# Patient Record
Sex: FEMALE | Race: BLACK OR AFRICAN AMERICAN | NOT HISPANIC OR LATINO
[De-identification: names, ages, dates, MRNs, and addresses within clinical notes are randomized per-mention and may not be internally consistent; named-entity substitution may affect disease eponyms.]

---

## 2017-01-05 PROBLEM — Z00.00 ENCOUNTER FOR PREVENTIVE HEALTH EXAMINATION: Noted: 2017-01-05

## 2017-01-30 ENCOUNTER — APPOINTMENT (OUTPATIENT)
Dept: ENDOCRINOLOGY | Facility: CLINIC | Age: 57
End: 2017-01-30

## 2017-01-30 ENCOUNTER — OTHER (OUTPATIENT)
Age: 57
End: 2017-01-30

## 2017-01-30 VITALS — WEIGHT: 231 LBS | DIASTOLIC BLOOD PRESSURE: 81 MMHG | SYSTOLIC BLOOD PRESSURE: 145 MMHG | HEART RATE: 102 BPM

## 2017-01-30 DIAGNOSIS — F17.200 NICOTINE DEPENDENCE, UNSPECIFIED, UNCOMPLICATED: ICD-10-CM

## 2017-01-30 RX ORDER — ERGOCALCIFEROL 1.25 MG/1
1.25 MG CAPSULE, LIQUID FILLED ORAL
Qty: 12 | Refills: 0 | Status: ACTIVE | COMMUNITY
Start: 2016-08-16

## 2017-01-30 RX ORDER — RABEPRAZOLE SODIUM 20 MG/1
20 TABLET, DELAYED RELEASE ORAL
Qty: 30 | Refills: 0 | Status: ACTIVE | COMMUNITY
Start: 2016-06-16

## 2017-01-30 RX ORDER — CYCLOBENZAPRINE HYDROCHLORIDE 10 MG/1
10 TABLET, FILM COATED ORAL
Qty: 15 | Refills: 0 | Status: ACTIVE | COMMUNITY
Start: 2016-12-01

## 2017-01-30 RX ORDER — ALCOHOL ANTISEPTIC PADS
70 PADS, MEDICATED (EA) TOPICAL
Qty: 100 | Refills: 0 | Status: ACTIVE | COMMUNITY
Start: 2016-11-22

## 2017-01-30 RX ORDER — SITAGLIPTIN 100 MG/1
100 TABLET, FILM COATED ORAL
Qty: 30 | Refills: 0 | Status: DISCONTINUED | COMMUNITY
Start: 2016-11-21 | End: 2017-01-30

## 2017-06-29 ENCOUNTER — APPOINTMENT (OUTPATIENT)
Dept: INTERNAL MEDICINE | Facility: CLINIC | Age: 57
End: 2017-06-29

## 2017-06-29 PROBLEM — Z00.00 ENCOUNTER FOR PREVENTIVE HEALTH EXAMINATION: Noted: 2017-06-29

## 2017-08-10 ENCOUNTER — APPOINTMENT (OUTPATIENT)
Dept: ENDOCRINOLOGY | Facility: CLINIC | Age: 57
End: 2017-08-10
Payer: COMMERCIAL

## 2017-08-10 VITALS — SYSTOLIC BLOOD PRESSURE: 129 MMHG | WEIGHT: 220 LBS | DIASTOLIC BLOOD PRESSURE: 83 MMHG | HEART RATE: 82 BPM

## 2017-08-10 PROCEDURE — 99214 OFFICE O/P EST MOD 30 MIN: CPT

## 2017-08-10 RX ORDER — FLUTICASONE PROPIONATE 50 UG/1
50 SPRAY, METERED NASAL
Qty: 48 | Refills: 0 | Status: DISCONTINUED | COMMUNITY
Start: 2016-08-16 | End: 2017-08-10

## 2017-08-11 LAB
ALBUMIN SERPL ELPH-MCNC: 4.4 G/DL
ALP BLD-CCNC: 68 U/L
ALT SERPL-CCNC: 14 U/L
ANION GAP SERPL CALC-SCNC: 16 MMOL/L
AST SERPL-CCNC: 15 U/L
BILIRUB SERPL-MCNC: 0.3 MG/DL
BUN SERPL-MCNC: 13 MG/DL
CALCIUM SERPL-MCNC: 9.6 MG/DL
CHLORIDE SERPL-SCNC: 102 MMOL/L
CHOLEST SERPL-MCNC: 154 MG/DL
CHOLEST/HDLC SERPL: 4.1 RATIO
CO2 SERPL-SCNC: 24 MMOL/L
CREAT SERPL-MCNC: 1.26 MG/DL
CREAT SPEC-SCNC: 297 MG/DL
GLUCOSE SERPL-MCNC: 94 MG/DL
HBA1C MFR BLD HPLC: 6 %
HDLC SERPL-MCNC: 38 MG/DL
LDLC SERPL CALC-MCNC: 67 MG/DL
MICROALBUMIN 24H UR DL<=1MG/L-MCNC: 5.6 MG/DL
MICROALBUMIN/CREAT 24H UR-RTO: 19 MG/G
POTASSIUM SERPL-SCNC: 3.5 MMOL/L
PROT SERPL-MCNC: 7.2 G/DL
SODIUM SERPL-SCNC: 142 MMOL/L
TRIGL SERPL-MCNC: 243 MG/DL
TSH SERPL-ACNC: 0.9 UIU/ML

## 2017-08-18 ENCOUNTER — RX RENEWAL (OUTPATIENT)
Age: 57
End: 2017-08-18

## 2017-11-07 ENCOUNTER — MEDICATION RENEWAL (OUTPATIENT)
Age: 57
End: 2017-11-07

## 2017-11-07 RX ORDER — BLOOD-GLUCOSE METER
KIT MISCELLANEOUS
Qty: 1 | Refills: 0 | Status: ACTIVE | COMMUNITY
Start: 2016-11-22 | End: 1900-01-01

## 2017-11-07 RX ORDER — LANCETS 28 GAUGE
EACH MISCELLANEOUS
Qty: 100 | Refills: 3 | Status: ACTIVE | COMMUNITY
Start: 2016-11-22 | End: 1900-01-01

## 2018-02-12 ENCOUNTER — RX RENEWAL (OUTPATIENT)
Age: 58
End: 2018-02-12

## 2018-02-12 ENCOUNTER — APPOINTMENT (OUTPATIENT)
Dept: ENDOCRINOLOGY | Facility: CLINIC | Age: 58
End: 2018-02-12
Payer: COMMERCIAL

## 2018-02-12 VITALS
DIASTOLIC BLOOD PRESSURE: 85 MMHG | HEART RATE: 77 BPM | WEIGHT: 225 LBS | HEIGHT: 66.5 IN | SYSTOLIC BLOOD PRESSURE: 129 MMHG | BODY MASS INDEX: 35.73 KG/M2

## 2018-02-12 PROCEDURE — 36415 COLL VENOUS BLD VENIPUNCTURE: CPT

## 2018-02-12 PROCEDURE — 99214 OFFICE O/P EST MOD 30 MIN: CPT | Mod: 25

## 2018-02-12 RX ORDER — METFORMIN HYDROCHLORIDE 1000 MG/1
1000 TABLET, COATED ORAL
Qty: 180 | Refills: 3 | Status: DISCONTINUED | COMMUNITY
Start: 2017-08-18 | End: 2018-02-12

## 2018-02-13 LAB
ANION GAP SERPL CALC-SCNC: 19 MMOL/L
BUN SERPL-MCNC: 9 MG/DL
CALCIUM SERPL-MCNC: 9.7 MG/DL
CHLORIDE SERPL-SCNC: 104 MMOL/L
CO2 SERPL-SCNC: 22 MMOL/L
CREAT SERPL-MCNC: 1.24 MG/DL
GLUCOSE SERPL-MCNC: 102 MG/DL
HBA1C MFR BLD HPLC: 6.2 %
POTASSIUM SERPL-SCNC: 4.2 MMOL/L
SODIUM SERPL-SCNC: 145 MMOL/L

## 2018-07-18 ENCOUNTER — RX RENEWAL (OUTPATIENT)
Age: 58
End: 2018-07-18

## 2018-11-27 ENCOUNTER — APPOINTMENT (OUTPATIENT)
Dept: NEUROLOGY | Facility: CLINIC | Age: 58
End: 2018-11-27
Payer: COMMERCIAL

## 2018-11-27 VITALS
HEIGHT: 66 IN | BODY MASS INDEX: 36.16 KG/M2 | OXYGEN SATURATION: 95 % | WEIGHT: 225 LBS | DIASTOLIC BLOOD PRESSURE: 98 MMHG | HEART RATE: 79 BPM | SYSTOLIC BLOOD PRESSURE: 146 MMHG

## 2018-11-27 PROCEDURE — 99204 OFFICE O/P NEW MOD 45 MIN: CPT

## 2019-02-11 ENCOUNTER — MEDICATION RENEWAL (OUTPATIENT)
Age: 59
End: 2019-02-11

## 2019-02-12 ENCOUNTER — MEDICATION RENEWAL (OUTPATIENT)
Age: 59
End: 2019-02-12

## 2019-03-08 ENCOUNTER — RX RENEWAL (OUTPATIENT)
Age: 59
End: 2019-03-08

## 2019-04-01 ENCOUNTER — FORM ENCOUNTER (OUTPATIENT)
Age: 59
End: 2019-04-01

## 2019-04-02 ENCOUNTER — OUTPATIENT (OUTPATIENT)
Dept: OUTPATIENT SERVICES | Facility: HOSPITAL | Age: 59
LOS: 1 days | End: 2019-04-02
Payer: COMMERCIAL

## 2019-04-02 ENCOUNTER — APPOINTMENT (OUTPATIENT)
Dept: CT IMAGING | Facility: HOSPITAL | Age: 59
End: 2019-04-02

## 2019-04-02 ENCOUNTER — APPOINTMENT (OUTPATIENT)
Dept: PULMONOLOGY | Facility: CLINIC | Age: 59
End: 2019-04-02
Payer: COMMERCIAL

## 2019-04-02 VITALS
HEIGHT: 66 IN | HEART RATE: 83 BPM | BODY MASS INDEX: 39.21 KG/M2 | TEMPERATURE: 98 F | DIASTOLIC BLOOD PRESSURE: 80 MMHG | WEIGHT: 244 LBS | OXYGEN SATURATION: 96 % | SYSTOLIC BLOOD PRESSURE: 140 MMHG

## 2019-04-02 DIAGNOSIS — Z80.9 FAMILY HISTORY OF MALIGNANT NEOPLASM, UNSPECIFIED: ICD-10-CM

## 2019-04-02 DIAGNOSIS — Z90.49 ACQUIRED ABSENCE OF OTHER SPECIFIED PARTS OF DIGESTIVE TRACT: Chronic | ICD-10-CM

## 2019-04-02 DIAGNOSIS — Z98.890 OTHER SPECIFIED POSTPROCEDURAL STATES: Chronic | ICD-10-CM

## 2019-04-02 DIAGNOSIS — Z96.649 PRESENCE OF UNSPECIFIED ARTIFICIAL HIP JOINT: Chronic | ICD-10-CM

## 2019-04-02 PROCEDURE — G0297: CPT | Mod: 26

## 2019-04-02 PROCEDURE — G0296 VISIT TO DETERM LDCT ELIG: CPT

## 2019-04-02 PROCEDURE — G0297: CPT

## 2019-04-08 ENCOUNTER — RESULT REVIEW (OUTPATIENT)
Age: 59
End: 2019-04-08

## 2019-04-22 ENCOUNTER — RX RENEWAL (OUTPATIENT)
Age: 59
End: 2019-04-22

## 2019-04-22 ENCOUNTER — APPOINTMENT (OUTPATIENT)
Dept: ENDOCRINOLOGY | Facility: CLINIC | Age: 59
End: 2019-04-22
Payer: COMMERCIAL

## 2019-04-22 VITALS
DIASTOLIC BLOOD PRESSURE: 89 MMHG | SYSTOLIC BLOOD PRESSURE: 141 MMHG | HEART RATE: 82 BPM | WEIGHT: 247 LBS | HEIGHT: 66 IN | BODY MASS INDEX: 39.7 KG/M2

## 2019-04-22 PROCEDURE — 99214 OFFICE O/P EST MOD 30 MIN: CPT

## 2019-04-22 RX ORDER — LIRAGLUTIDE 6 MG/ML
18 INJECTION SUBCUTANEOUS
Qty: 1 | Refills: 2 | Status: DISCONTINUED | COMMUNITY
Start: 2017-01-30 | End: 2019-04-22

## 2019-04-22 RX ORDER — ALLOPURINOL 100 MG/1
100 TABLET ORAL
Qty: 90 | Refills: 0 | Status: DISCONTINUED | COMMUNITY
Start: 2016-08-16 | End: 2019-04-22

## 2019-04-22 NOTE — ASSESSMENT
[FreeTextEntry1] : Diabetes, obesity. BMI 40.  goal A1c 7.0 or less.  A1c has been increasing.\par Change victoza to Ozempic for once/weekly dosing and possibly for more weight loss effect.  continue metformin but increase to BID dosing.\par will try adding bupropion for weight loss and smoking cessation.  OK to use nicotine patch with bupropion.  start with 1 tab hs and then increase to 2 tab daily.\par RTO 4 months

## 2019-04-22 NOTE — PHYSICAL EXAM
[Alert] : alert [Healthy Appearance] : healthy appearance [Normal Voice/Communication] : normal voice communication [No Lid Lag] : no lid lag [No Proptosis] : no proptosis [Thyroid Not Enlarged] : the thyroid was not enlarged [Normal Hearing] : hearing was normal [No Thyroid Nodules] : there were no palpable thyroid nodules [Clear to Auscultation] : lungs were clear to auscultation bilaterally [Normal S1, S2] : normal S1 and S2 [Regular Rhythm] : with a regular rhythm [Pedal Pulses Normal] : the pedal pulses are present [No Edema] : there was no peripheral edema [Acanthosis Nigricans] : acanthosis nigricans [Normal Sensation on Monofilament Testing] : normal sensation on monofilament testing of lower extremities [Normal Affect] : the affect was normal [Normal Mood] : the mood was normal [Foot Ulcers] : no foot ulcers [de-identified] : in wheelchair scooter

## 2019-04-22 NOTE — DATA REVIEWED
[FreeTextEntry1] : 9/18: A1c 7.0%, tot chol 162, trig 235, HDL 42, LDL 87, TSH 1.19, 25D 32\par 2/18: A1c 6.2%, Cr 1.24\par 8/17: A1c 6.0%, tot chol 154, trig 243, HDL 38, LDL 67, urine microalb/cr 19, TSH 0.90

## 2019-04-22 NOTE — HISTORY OF PRESENT ILLNESS
[FreeTextEntry1] : sugars have been higher the past 3 weeks.\par usually is around 140s in the mornings and now is ranging 170-200.\par no nocturia, polyuria\par not losing weight on Victoza.  taking metformin once/day (not BID)\par has eye and podiatry appts later this month (on 4/25)\par working on smoking cessation.  smoking under 1 ppd.  using nicotine patch on and off (? causing rash). Chantix not working.  tried Zyban in past  (?rash but may have been due to other meds that she was taking at the same time)\par \par PMH: hip replacement 2014, 2016\par gastric sleeve 2010\par FH: diabetes, sister\par Pt did not have gestational diabetes.\par \par Meds:\par Victoza 1.8mg/day\par metformin 1g bid--not taking regularly\par Celebrex 200mg bid, rabeprazole 20mg\par valsartan HCTZ\par allopurinol\par cyclobenzaprine prn\par vitamin D3\par

## 2019-04-24 ENCOUNTER — RX RENEWAL (OUTPATIENT)
Age: 59
End: 2019-04-24

## 2019-07-02 ENCOUNTER — APPOINTMENT (OUTPATIENT)
Dept: ENDOCRINOLOGY | Facility: CLINIC | Age: 59
End: 2019-07-02
Payer: COMMERCIAL

## 2019-07-02 VITALS
DIASTOLIC BLOOD PRESSURE: 67 MMHG | WEIGHT: 247 LBS | SYSTOLIC BLOOD PRESSURE: 126 MMHG | BODY MASS INDEX: 39.87 KG/M2 | HEART RATE: 87 BPM

## 2019-07-02 PROCEDURE — 99214 OFFICE O/P EST MOD 30 MIN: CPT

## 2019-07-02 RX ORDER — PEN NEEDLE, DIABETIC 29 G X1/2"
31G X 5 MM NEEDLE, DISPOSABLE MISCELLANEOUS
Qty: 100 | Refills: 0 | Status: DISCONTINUED | COMMUNITY
Start: 2016-11-22 | End: 2019-07-02

## 2019-07-03 ENCOUNTER — RX RENEWAL (OUTPATIENT)
Age: 59
End: 2019-07-03

## 2019-07-03 LAB
ANION GAP SERPL CALC-SCNC: 14 MMOL/L
BUN SERPL-MCNC: 11 MG/DL
CALCIUM SERPL-MCNC: 10.1 MG/DL
CHLORIDE SERPL-SCNC: 102 MMOL/L
CO2 SERPL-SCNC: 24 MMOL/L
CREAT SERPL-MCNC: 1.29 MG/DL
CREAT SPEC-SCNC: 255 MG/DL
ESTIMATED AVERAGE GLUCOSE: 177 MG/DL
GLUCOSE SERPL-MCNC: 123 MG/DL
HBA1C MFR BLD HPLC: 7.8 %
MICROALBUMIN 24H UR DL<=1MG/L-MCNC: 4.3 MG/DL
MICROALBUMIN/CREAT 24H UR-RTO: 17 MG/G
POTASSIUM SERPL-SCNC: 4 MMOL/L
SODIUM SERPL-SCNC: 140 MMOL/L

## 2019-07-03 NOTE — ASSESSMENT
[FreeTextEntry1] : Diabetes, obesity. BMI 39. A1c goal < 7%.  continue metformin and Ozempic. Advised to take metformin BID.\par ok to either reduce bupropion dose to once/day or take BID (instead of 2 tab at once), if either of these ways is more effective for her (to reduce smoking and food cravings)\par Neuropathy symptoms.  suggested trying alpha lipoic acid and capsaicin cream.  if symptoms persist, then will try prescription meds (gabapentin, duloxetine, etc).\par RTO 4 months\par

## 2019-07-03 NOTE — PHYSICAL EXAM
[Alert] : alert [Healthy Appearance] : healthy appearance [Normal Voice/Communication] : normal voice communication [No Proptosis] : no proptosis [Normal Hearing] : hearing was normal [Thyroid Not Enlarged] : the thyroid was not enlarged [No Lid Lag] : no lid lag [No Thyroid Nodules] : there were no palpable thyroid nodules [Clear to Auscultation] : lungs were clear to auscultation bilaterally [Normal S1, S2] : normal S1 and S2 [Regular Rhythm] : with a regular rhythm [Pedal Pulses Normal] : the pedal pulses are present [Normal Sensation on Monofilament Testing] : normal sensation on monofilament testing of lower extremities [Acanthosis Nigricans] : acanthosis nigricans [Normal Mood] : the mood was normal [Normal Affect] : the affect was normal [Foot Ulcers] : no foot ulcers [de-identified] : trace pedal edema [de-identified] : in wheelchair scooter

## 2019-07-03 NOTE — HISTORY OF PRESENT ILLNESS
[FreeTextEntry1] : Freestyle meter: 14d 152 (n14).  30d 159 (n31). testing in am.  135-160 range\par having pain in feet, shooting pains.  at night, but times vary. \par changed to Ozempic at end of May.\par when she was taking 1 tab of bupropion per day, she could feel less cravings to smoke but then when she increased to 2 tab/day, she didn't feel that reduction in urge.\par no chest pain or SOB.  had one day that she had polyuria, and then resolved\par saw ophtho in April, eyes ok.\par \par PMH: hip replacement 2014, 2016\par gastric sleeve 2010\par FH: diabetes, sister\par Pt did not have gestational diabetes.\par \par Meds:\par ozempic 1mg/week\par metformin 500mg/day\par Celebrex 200mg bid, rabeprazole 20mg\par valsartan HCTZ\par allopurinol\par cyclobenzaprine prn\par vitamin D3, flaxseed, vinegar pills\par

## 2020-01-20 ENCOUNTER — RX RENEWAL (OUTPATIENT)
Age: 60
End: 2020-01-20

## 2020-04-27 ENCOUNTER — RX RENEWAL (OUTPATIENT)
Age: 60
End: 2020-04-27

## 2020-04-27 RX ORDER — BLOOD SUGAR DIAGNOSTIC
STRIP MISCELLANEOUS
Qty: 100 | Refills: 1 | Status: ACTIVE | COMMUNITY
Start: 2016-11-22 | End: 1900-01-01

## 2020-05-22 ENCOUNTER — RX RENEWAL (OUTPATIENT)
Age: 60
End: 2020-05-22

## 2020-05-26 ENCOUNTER — RX RENEWAL (OUTPATIENT)
Age: 60
End: 2020-05-26

## 2020-06-14 ENCOUNTER — RX RENEWAL (OUTPATIENT)
Age: 60
End: 2020-06-14

## 2020-07-19 ENCOUNTER — RX RENEWAL (OUTPATIENT)
Age: 60
End: 2020-07-19

## 2020-07-19 RX ORDER — BUPROPION HYDROCHLORIDE 100 MG/1
100 TABLET, FILM COATED, EXTENDED RELEASE ORAL TWICE DAILY
Qty: 180 | Refills: 1 | Status: ACTIVE | COMMUNITY
Start: 2019-04-22 | End: 1900-01-01

## 2020-10-15 ENCOUNTER — APPOINTMENT (OUTPATIENT)
Dept: ENDOCRINOLOGY | Facility: CLINIC | Age: 60
End: 2020-10-15
Payer: COMMERCIAL

## 2020-10-15 VITALS
HEIGHT: 66 IN | HEART RATE: 109 BPM | WEIGHT: 245 LBS | BODY MASS INDEX: 39.37 KG/M2 | DIASTOLIC BLOOD PRESSURE: 88 MMHG | SYSTOLIC BLOOD PRESSURE: 156 MMHG

## 2020-10-15 PROCEDURE — G0008: CPT

## 2020-10-15 PROCEDURE — 99215 OFFICE O/P EST HI 40 MIN: CPT | Mod: 25

## 2020-10-15 PROCEDURE — 90662 IIV NO PRSV INCREASED AG IM: CPT

## 2020-10-15 RX ORDER — CLOTRIMAZOLE 10 MG/G
1 CREAM TOPICAL TWICE DAILY
Qty: 1 | Refills: 0 | Status: ACTIVE | COMMUNITY
Start: 2020-10-15 | End: 1900-01-01

## 2020-10-15 RX ORDER — SALINE 7; 19 G/118ML; G/118ML
4 ENEMA RECTAL
Qty: 1 | Refills: 1 | Status: ACTIVE | COMMUNITY
Start: 2020-10-15 | End: 1900-01-01

## 2020-10-15 RX ORDER — LOSARTAN POTASSIUM 50 MG/1
50 TABLET, FILM COATED ORAL
Refills: 0 | Status: ACTIVE | COMMUNITY

## 2020-10-15 RX ORDER — CELECOXIB 200 MG/1
200 CAPSULE ORAL
Qty: 180 | Refills: 0 | Status: DISCONTINUED | COMMUNITY
Start: 2016-08-16 | End: 2020-10-15

## 2020-10-15 RX ORDER — METFORMIN HYDROCHLORIDE 500 MG/1
500 TABLET, COATED ORAL TWICE DAILY
Qty: 180 | Refills: 0 | Status: DISCONTINUED | COMMUNITY
Start: 2016-11-21 | End: 2020-10-15

## 2020-10-15 RX ORDER — VALSARTAN AND HYDROCHLOROTHIAZIDE 160; 12.5 MG/1; MG/1
160-12.5 TABLET, FILM COATED ORAL DAILY
Qty: 90 | Refills: 3 | Status: DISCONTINUED | COMMUNITY
Start: 2016-08-16 | End: 2020-10-15

## 2020-10-15 RX ORDER — AMLODIPINE BESYLATE AND BENAZEPRIL HYDROCHLORIDE 10; 40 MG/1; MG/1
10-40 CAPSULE ORAL
Refills: 0 | Status: ACTIVE | COMMUNITY

## 2020-10-15 RX ORDER — NICOTINE 21 MG/24HR
21 PATCH, TRANSDERMAL 24 HOURS TRANSDERMAL
Qty: 1 | Refills: 1 | Status: DISCONTINUED | COMMUNITY
Start: 2016-11-22 | End: 2020-10-15

## 2020-10-15 NOTE — ASSESSMENT
[FreeTextEntry1] : Diabetes, suboptimal.  Obesity. BMI 39. A1c goal < 7%.  \par will change metformin to ER version since having diarrhea and only taking once/day.\par Add Jardiance to lower A1c to goal; Jardiance also has cardiac and renal benefits and may help lose some weight.  Potential side effects  discussed: increased urination, increased risk of urinary/genital infection, low BP, dizziness. Advised to drink extra water (avoid dehydration) and empty bladder frequently.\par \par smoker.\par discussed smoking cessation, suggested reducing cigarette use to 5cig/day and then setting a quit date for herself.  She also wants to try bupropion again, which is fine with me; bupropion may also help reduce food cravings and emotional eating.   ok to try nicotine gum (will send rx)\par \par Recommended seeing primary doctor regarding headaches.\par Lotrimin cream for tinea pedis.\par RTO 3 months

## 2020-10-15 NOTE — PHYSICAL EXAM
[Healthy Appearance] : healthy appearance [Alert] : alert [No Proptosis] : no proptosis [No Lid Lag] : no lid lag [Normal Hearing] : hearing was normal [No LAD] : no lymphadenopathy [Thyroid Not Enlarged] : the thyroid was not enlarged [Normal S1, S2] : normal S1 and S2 [Clear to Auscultation] : lungs were clear to auscultation bilaterally [Regular Rhythm] : with a regular rhythm [Pedal Pulses Normal] : the pedal pulses are present [Acanthosis Nigricans] : acanthosis nigricans present [Foot Ulcers] : no foot ulcers [Normal Sensation on Monofilament Testing] : normal sensation on monofilament testing of lower extremities [Normal Affect] : the affect was normal [Normal Mood] : the mood was normal [de-identified] : in scooter [de-identified] : 1+ pedal edema [de-identified] : rash on L foot, tinea pedis

## 2020-10-15 NOTE — HISTORY OF PRESENT ILLNESS
[FreeTextEntry1] : glucose 147 today am.    ranging 140-160s usually in morning.  \par main complaints are swollen ankles and severe headache, from front to back, from night to the morning.  Tried different pillows, doesn't help. \par nocturia 1-2x/night.  no polyuria, polydipsia, SOB, chest pain, or neuropathy symptoms \par c/o throbbing in L foot and sore/pain in R big toe.\par taking metformin in am only due to diarrhea. tolerating Ozempic\par not taking bupropion.  still smoking 1/2 pack/day\par saw ophtho recently, no DR\par \par PMH: hip replacement 2014, 2016\par gastric sleeve 2010\par FH: diabetes, sister\par Pt did not have gestational diabetes.\par \par Meds:\par ozempic 1mg/week\par metformin 500mg/day\par  rabeprazole 20mg\par losartan 100mg, amlodipine 10mg\par allopurinol\par cyclobenzaprine prn\par vitamin D3, flaxseed, vinegar pills\par

## 2020-10-15 NOTE — DATA REVIEWED
[FreeTextEntry1] : 8/20 (pt copies): A1c 7.6%, B12 328, Hb 13\par 9/18: A1c 7.0%, tot chol 162, trig 235, HDL 42, LDL 87, TSH 1.19, 25D 32\par 2/18: A1c 6.2%, Cr 1.24\par 8/17: A1c 6.0%, tot chol 154, trig 243, HDL 38, LDL 67, urine microalb/cr 19, TSH 0.90

## 2020-12-10 ENCOUNTER — NON-APPOINTMENT (OUTPATIENT)
Age: 60
End: 2020-12-10

## 2020-12-21 ENCOUNTER — APPOINTMENT (OUTPATIENT)
Dept: PULMONOLOGY | Facility: CLINIC | Age: 60
End: 2020-12-21
Payer: COMMERCIAL

## 2020-12-21 VITALS — WEIGHT: 245 LBS | HEIGHT: 66 IN | BODY MASS INDEX: 39.37 KG/M2

## 2020-12-21 PROCEDURE — 99072 ADDL SUPL MATRL&STAF TM PHE: CPT

## 2020-12-21 PROCEDURE — G0296 VISIT TO DETERM LDCT ELIG: CPT

## 2020-12-21 RX ORDER — NICOTINE TRANSDERMAL SYSTEM 21 MG/24H
21 PATCH, EXTENDED RELEASE TRANSDERMAL DAILY
Qty: 2 | Refills: 2 | Status: ACTIVE | COMMUNITY
Start: 2020-12-21 | End: 1900-01-01

## 2020-12-21 NOTE — ASSESSMENT
[Discussed Risks and Advised to Quit Smoking] : Discussed risks and advised to quit smoking [Discussed Cessation Medication] : cessation medication was discussed [Discussed Cessation Strategies] : cessation strategies were discussed [Ready] : Patient is ready for cessation intervention [Preparation] : Preparation: The patient is getting ready to quit smoking

## 2020-12-21 NOTE — PLAN
[Smoking Cessation Guidance Provided] : Smoking cessation guidance was provided to patient [[facility]] : referred to [facility] [phone] [Smoking Cessation] : smoking cessation [FreeTextEntry1] : Plan:\par -Low Dose CT chest for lung cancer screening\par -Follow up with patient and her referring provider after her LDCT results have been reviewed by the multi-disciplinary clinical team\par -Encouraged smoking cessation\par -Referral to CTC\par \par Engaged in shared decision making with Ms. TITO ROGER . Answered all questions. She verbalized understanding and agreement. She knows to call back with any questions or concerns

## 2020-12-21 NOTE — HISTORY OF PRESENT ILLNESS
[Current] : current smoker [_____ pack-years] : [unfilled] pack-years [TextBox_13] : Referred by Dr. Gosia Pérez\par \par Ms. TITO ROGER is a 60 year old woman with a history of HTN and DM\par \par Over the phone today we reviewed eligibility for, as well as, discussion of Low-Dose CT lung cancer screening program. Reviewed and confirmed that the patient meets screening eligibility criteria:\par -Age: 60 year \par Smoking status:\par -Current smoker\par -Number of pack(s) per day: 1\par -Number of year smoked: 45\par -Number of pack years smokin\par \par Multiple past quit attempts. She would like to try the 21 mg patch. Did not find the 14 mg strong enough\par \par \par Ms. ROGER denies any signs or symptoms of lung cancer including new cough, change in cough, hemoptysis and unintentional weight loss.\par \par Ms. ROGER denies any personal history of lung cancer. Lung cancer in a 1st degree relative, sister had lung cancer. Denies any history of lung disease. Denies any history of occupational exposures. \par  [TextBox_6] : 1 [TextBox_8] : 45

## 2020-12-21 NOTE — REASON FOR VISIT
[Home] : at home, [unfilled] , at the time of the visit. [Medical Office: ([facility])___] : at the medical office located in  [Verbal consent obtained from patient] : the patient, [unfilled] [Annual Follow-Up] : an annual follow-up visit [Review of Eligibility] : review of eligibility [Low-Dose CT Screening Discussion] : low-dose CT lung cancer screening discussion

## 2020-12-29 ENCOUNTER — RESULT REVIEW (OUTPATIENT)
Age: 60
End: 2020-12-29

## 2020-12-29 ENCOUNTER — APPOINTMENT (OUTPATIENT)
Dept: CT IMAGING | Facility: HOSPITAL | Age: 60
End: 2020-12-29

## 2020-12-29 ENCOUNTER — OUTPATIENT (OUTPATIENT)
Dept: OUTPATIENT SERVICES | Facility: HOSPITAL | Age: 60
LOS: 1 days | End: 2020-12-29
Payer: COMMERCIAL

## 2020-12-29 DIAGNOSIS — Z90.49 ACQUIRED ABSENCE OF OTHER SPECIFIED PARTS OF DIGESTIVE TRACT: Chronic | ICD-10-CM

## 2020-12-29 DIAGNOSIS — Z96.649 PRESENCE OF UNSPECIFIED ARTIFICIAL HIP JOINT: Chronic | ICD-10-CM

## 2020-12-29 DIAGNOSIS — Z98.890 OTHER SPECIFIED POSTPROCEDURAL STATES: Chronic | ICD-10-CM

## 2020-12-29 PROCEDURE — 71271 CT THORAX LUNG CANCER SCR C-: CPT

## 2020-12-29 PROCEDURE — G0297: CPT | Mod: 26

## 2021-01-14 ENCOUNTER — APPOINTMENT (OUTPATIENT)
Dept: ENDOCRINOLOGY | Facility: CLINIC | Age: 61
End: 2021-01-14
Payer: COMMERCIAL

## 2021-01-14 VITALS
DIASTOLIC BLOOD PRESSURE: 73 MMHG | HEIGHT: 66 IN | HEART RATE: 86 BPM | SYSTOLIC BLOOD PRESSURE: 126 MMHG | WEIGHT: 254 LBS | BODY MASS INDEX: 40.82 KG/M2

## 2021-01-14 PROCEDURE — 99214 OFFICE O/P EST MOD 30 MIN: CPT

## 2021-01-14 PROCEDURE — 99072 ADDL SUPL MATRL&STAF TM PHE: CPT

## 2021-01-15 LAB
SARS-COV-2 IGG SERPL IA-ACNC: 0.11 INDEX
SARS-COV-2 IGG SERPL QL IA: NEGATIVE
SARS-COV-2 N GENE NPH QL NAA+PROBE: NOT DETECTED

## 2021-01-28 NOTE — HISTORY OF PRESENT ILLNESS
[FreeTextEntry1] : glucose 154 today.  She is interested in Brandi CGM, which her sister is using.\par had vaginal itching when first started Jardiance, but then it went away\par due for ophtho soon, saw last year\par gets stabbing feeling in toes every now and then\par not exercising much, says she is working on it\par SOB related to smoking, also working on this.   smoking less than 1 pack/day.  has bupropion pills but did not start them.\par no chest pains\par \par PMH: hip replacement 2014, 2016\par gastric sleeve 2010\par FH: diabetes, sister\par Pt did not have gestational diabetes.\par \par Meds:\par ozempic 1mg/week\par metformin ER 750mg/day, Jardiance 10mg\par  rabeprazole 20mg\par losartan 100mg, amlodipine 10mg\par allopurinol\par cyclobenzaprine prn\par vitamin D3, flaxseed, vinegar pills\par

## 2021-01-28 NOTE — ADDENDUM
[FreeTextEntry1] : Labs reviewed from 1/25/21: A1c 6.7%, tot chol 175, trig 213, HDL 43, LDL 99, urine microalbumin/cr 150, B12 359, TSH 1.06.  Recommended starting statin.

## 2021-01-28 NOTE — ASSESSMENT
[FreeTextEntry1] : Diabetes, obesity. BMI 41. A1c goal < 7%.  \par Agree with starting CGM, which will hopefully help control sugars better because she will be able to test more frequently and know which foods cause hyperglycemia.\par video shown on how to place Tameka sensor.  Tameka 2 reader given (sample).  Explained that CGM has 10-20% error on glucose readings, and there is 15-20 min delay in glucose reading when glucose is increasing or decreasing, so if patient suspects hypoglycemia, need to confirm with glucometer. \par continue current regimen; will increase Jardiance dose if A1c > 7.5%.\par [facility] form given to check labs this weekend. She is requesting Covid testing (Ab and PCR) today.\par Will review labs with patient after visit. \par \par Discussed smoking cessation\par would try to wean down to 5cig/day, then set quit date and on quit date, start bupropion tabs.\par RTO 4 months

## 2021-02-19 PROBLEM — N64.9 DISORDER OF BREAST, UNSPECIFIED: Status: ACTIVE | Noted: 2021-02-19

## 2021-02-19 PROBLEM — Z86.79 HISTORY OF HYPERTENSION: Status: RESOLVED | Noted: 2021-02-19 | Resolved: 2021-02-19

## 2021-02-22 ENCOUNTER — APPOINTMENT (OUTPATIENT)
Dept: BREAST CENTER | Facility: CLINIC | Age: 61
End: 2021-02-22

## 2021-02-22 DIAGNOSIS — N64.9 DISORDER OF BREAST, UNSPECIFIED: ICD-10-CM

## 2021-02-22 DIAGNOSIS — Z86.79 PERSONAL HISTORY OF OTHER DISEASES OF THE CIRCULATORY SYSTEM: ICD-10-CM

## 2021-04-09 NOTE — DATA REVIEWED
[FreeTextEntry1] : 9/18: A1c 7.0%, tot chol 162, trig 235, HDL 42, LDL 87, TSH 1.19, 25D 32\par 2/18: A1c 6.2%, Cr 1.24\par 8/17: A1c 6.0%, tot chol 154, trig 243, HDL 38, LDL 67, urine microalb/cr 19, TSH 0.90
- - -

## 2021-05-24 DIAGNOSIS — E11.9 TYPE 2 DIABETES MELLITUS W/OUT COMPLICATIONS: ICD-10-CM

## 2021-07-12 ENCOUNTER — RX RENEWAL (OUTPATIENT)
Age: 61
End: 2021-07-12

## 2021-07-22 ENCOUNTER — APPOINTMENT (OUTPATIENT)
Dept: ENDOCRINOLOGY | Facility: CLINIC | Age: 61
End: 2021-07-22
Payer: COMMERCIAL

## 2021-07-22 VITALS
DIASTOLIC BLOOD PRESSURE: 81 MMHG | WEIGHT: 250 LBS | HEART RATE: 92 BPM | SYSTOLIC BLOOD PRESSURE: 186 MMHG | BODY MASS INDEX: 40.35 KG/M2

## 2021-07-22 PROCEDURE — 99214 OFFICE O/P EST MOD 30 MIN: CPT

## 2021-07-22 RX ORDER — SEMAGLUTIDE 1.34 MG/ML
4 INJECTION, SOLUTION SUBCUTANEOUS
Qty: 3 | Refills: 5 | Status: ACTIVE | COMMUNITY
Start: 2019-04-22 | End: 1900-01-01

## 2021-07-22 RX ORDER — METFORMIN ER 750 MG 750 MG/1
750 TABLET ORAL
Qty: 90 | Refills: 3 | Status: ACTIVE | COMMUNITY
Start: 2020-10-15 | End: 1900-01-01

## 2021-07-22 RX ORDER — FLASH GLUCOSE SENSOR
KIT MISCELLANEOUS
Qty: 2 | Refills: 5 | Status: ACTIVE | COMMUNITY
Start: 2021-01-14 | End: 1900-01-01

## 2021-07-22 RX ORDER — EMPAGLIFLOZIN 10 MG/1
10 TABLET, FILM COATED ORAL
Qty: 30 | Refills: 5 | Status: ACTIVE | COMMUNITY
Start: 2020-10-15 | End: 1900-01-01

## 2021-07-22 NOTE — DATA REVIEWED
[FreeTextEntry1] : 1/21: A1c 6.7%, tot chol 175, HDL 43, trig 213, LDL 99, urine microalbumin/cr 150, TSH 1.06, B12 359\par 8/20 (pt copies): A1c 7.6%, B12 328, Hb 13\par 9/18: A1c 7.0%, tot chol 162, trig 235, HDL 42, LDL 87, TSH 1.19, 25D 32\par 2/18: A1c 6.2%, Cr 1.24\par 8/17: A1c 6.0%, tot chol 154, trig 243, HDL 38, LDL 67, urine microalb/cr 19, TSH 0.90

## 2021-07-22 NOTE — HISTORY OF PRESENT ILLNESS
[FreeTextEntry1] : upset today because she wasn't told she needed  a referral until late yesterday and  did not offer helpful suggestions\par sugar was high today, 212 this morning\par has tingling and pain in R foot only, in heel and toes area\par saw ophtho after our last visit,  no DR\par vaccinated for Covid (Pfizer)\par still smoking, less than 1 ppd, but will be new grandma this November so she will try to quit before then.\par \par PMH: hip replacement 2014, 2016\par gastric sleeve 2010\par FH: diabetes, sister\par Pt did not have gestational diabetes.\par \par Meds:\par ozempic 1mg/week\par metformin ER 750mg/day, Jardiance 10mg\par atorvastatin 10mg\par  rabeprazole 20mg\par losartan 100mg, amlodipine 10mg\par allopurinol\par cyclobenzaprine prn\par vitamin D3, flaxseed, vinegar pills\par

## 2021-07-22 NOTE — ASSESSMENT
[FreeTextEntry1] : Diabetes, obesity. BMI 40. A1c goal < 7%.  Microalbuminuria on ACEI.\par Check labs today.  Statin started since last visit\par Video shown on how to apply Brandi sensor.  Advised pt to download Pronota lobito to use phone as reader. Once she gets the sensor, she can come during my lunch break and then I can place the first sensor for her.\par Continue current regimen.\par \par Discussed smoking cessation\par would try to wean down to 5cig/day, then set quit date and on quit date, start nicotine replacement.\par RTO 4 months

## 2021-07-22 NOTE — PHYSICAL EXAM
[Alert] : alert [Healthy Appearance] : healthy appearance [No Proptosis] : no proptosis [No Lid Lag] : no lid lag [Normal Hearing] : hearing was normal [No LAD] : no lymphadenopathy [Thyroid Not Enlarged] : the thyroid was not enlarged [Clear to Auscultation] : lungs were clear to auscultation bilaterally [Normal S1, S2] : normal S1 and S2 [Regular Rhythm] : with a regular rhythm [No Edema] : no peripheral edema [Normal Sensation on Monofilament Testing] : normal sensation on monofilament testing of lower extremities [Normal Affect] : the affect was normal [Normal Mood] : the mood was normal [Foot Ulcers] : no foot ulcers [de-identified] : reduced pedal pulses [de-identified] : reduced arches in feet

## 2021-07-23 LAB
ALBUMIN SERPL ELPH-MCNC: 4.3 G/DL
ALP BLD-CCNC: 94 U/L
ALT SERPL-CCNC: 22 U/L
ANION GAP SERPL CALC-SCNC: 13 MMOL/L
AST SERPL-CCNC: 18 U/L
BILIRUB SERPL-MCNC: 0.3 MG/DL
BUN SERPL-MCNC: 13 MG/DL
CALCIUM SERPL-MCNC: 9.8 MG/DL
CHLORIDE SERPL-SCNC: 104 MMOL/L
CHOLEST SERPL-MCNC: 112 MG/DL
CO2 SERPL-SCNC: 24 MMOL/L
CREAT SERPL-MCNC: 1.14 MG/DL
ESTIMATED AVERAGE GLUCOSE: 169 MG/DL
GLUCOSE SERPL-MCNC: 122 MG/DL
HBA1C MFR BLD HPLC: 7.5 %
HDLC SERPL-MCNC: 39 MG/DL
LDLC SERPL CALC-MCNC: 40 MG/DL
NONHDLC SERPL-MCNC: 73 MG/DL
POTASSIUM SERPL-SCNC: 4.7 MMOL/L
PROT SERPL-MCNC: 7 G/DL
SODIUM SERPL-SCNC: 141 MMOL/L
TRIGL SERPL-MCNC: 167 MG/DL

## 2021-07-29 ENCOUNTER — RX RENEWAL (OUTPATIENT)
Age: 61
End: 2021-07-29

## 2021-07-29 RX ORDER — ATORVASTATIN CALCIUM 10 MG/1
10 TABLET, FILM COATED ORAL DAILY
Qty: 90 | Refills: 3 | Status: ACTIVE | COMMUNITY
Start: 2021-01-29 | End: 1900-01-01

## 2022-08-16 ENCOUNTER — RX RENEWAL (OUTPATIENT)
Age: 62
End: 2022-08-16

## 2023-04-28 ENCOUNTER — RX RENEWAL (OUTPATIENT)
Age: 63
End: 2023-04-28